# Patient Record
Sex: MALE | Race: WHITE
[De-identification: names, ages, dates, MRNs, and addresses within clinical notes are randomized per-mention and may not be internally consistent; named-entity substitution may affect disease eponyms.]

---
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
## 2022-07-09 ENCOUNTER — HOSPITAL ENCOUNTER (EMERGENCY)
Age: 22
Discharge: HOME | End: 2022-07-09
Payer: COMMERCIAL

## 2022-07-09 VITALS
OXYGEN SATURATION: 98 % | DIASTOLIC BLOOD PRESSURE: 78 MMHG | SYSTOLIC BLOOD PRESSURE: 122 MMHG | RESPIRATION RATE: 16 BRPM | HEART RATE: 78 BPM

## 2022-07-09 VITALS
OXYGEN SATURATION: 98 % | BODY MASS INDEX: 23 KG/M2 | TEMPERATURE: 97.34 F | RESPIRATION RATE: 15 BRPM | SYSTOLIC BLOOD PRESSURE: 124 MMHG | HEART RATE: 74 BPM | DIASTOLIC BLOOD PRESSURE: 77 MMHG

## 2022-07-09 DIAGNOSIS — Z72.0: ICD-10-CM
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
## 2022-07-09 DIAGNOSIS — S60.221A: Primary | ICD-10-CM

## 2022-07-09 PROCEDURE — 73130 X-RAY EXAM OF HAND: CPT

## 2022-07-09 PROCEDURE — 99282 EMERGENCY DEPT VISIT SF MDM: CPT

## 2022-10-28 ENCOUNTER — HOSPITAL ENCOUNTER (EMERGENCY)
Dept: HOSPITAL 100 - ED | Age: 22
Discharge: HOME | End: 2022-10-28
Payer: COMMERCIAL

## 2022-10-28 VITALS
OXYGEN SATURATION: 100 % | SYSTOLIC BLOOD PRESSURE: 142 MMHG | RESPIRATION RATE: 16 BRPM | TEMPERATURE: 98.24 F | HEART RATE: 85 BPM | DIASTOLIC BLOOD PRESSURE: 80 MMHG | BODY MASS INDEX: 24.3 KG/M2

## 2022-10-28 VITALS — RESPIRATION RATE: 16 BRPM

## 2022-10-28 VITALS
OXYGEN SATURATION: 100 % | RESPIRATION RATE: 16 BRPM | TEMPERATURE: 98.3 F | SYSTOLIC BLOOD PRESSURE: 142 MMHG | DIASTOLIC BLOOD PRESSURE: 80 MMHG | HEART RATE: 85 BPM

## 2022-10-28 DIAGNOSIS — L02.91: Primary | ICD-10-CM

## 2022-10-28 PROCEDURE — 99283 EMERGENCY DEPT VISIT LOW MDM: CPT

## 2023-05-24 ENCOUNTER — HOSPITAL ENCOUNTER (OUTPATIENT)
Dept: HOSPITAL 100 - PT | Age: 23
Discharge: HOME | End: 2023-05-24
Payer: COMMERCIAL

## 2023-05-24 DIAGNOSIS — M25.312: Primary | ICD-10-CM

## 2023-05-24 PROCEDURE — 97161 PT EVAL LOW COMPLEX 20 MIN: CPT

## 2023-05-24 PROCEDURE — G0283 ELEC STIM OTHER THAN WOUND: HCPCS

## 2023-05-24 PROCEDURE — 97014 ELECTRIC STIMULATION THERAPY: CPT

## 2023-06-13 ENCOUNTER — HOSPITAL ENCOUNTER (OUTPATIENT)
Age: 23
Discharge: HOME | End: 2023-06-13
Payer: COMMERCIAL

## 2023-06-13 DIAGNOSIS — M25.312: Primary | ICD-10-CM

## 2023-06-13 PROCEDURE — 23350 INJECTION FOR SHOULDER X-RAY: CPT

## 2023-06-13 PROCEDURE — 73222 MRI JOINT UPR EXTREM W/DYE: CPT

## 2023-06-13 PROCEDURE — 77002 NEEDLE LOCALIZATION BY XRAY: CPT

## 2024-02-20 NOTE — PROGRESS NOTES
Subjective      HPI:          Grzegorz Solano is a 23 y.o. male 23 y.o. is here today for PE/health maintenance      No chief complaint on file.      Pt was last seen in the office 2019       When was last Tdap?  2013 in our records    Has pt had more than one Gardisil ?          Pt also due for f/up chronic medical problems-remote history ADD- on adderall- 2017        [place] recommend  laboratory screening for Hepatitis C for all adults ages 18- 79 years.      [place] recommend  laboratory  screening for HIV in patients ages 15-65      Health Maintenance Topics        Topic Date     Yearly Adult Physical today    Lipid Panel Never done    HIV Screening Never done    HPV Vaccines Never done    Hepatitis C Screening Never done     Health Maintenance Topics with due status: Not Due       Topic Last Completion Date    Zoster Vaccines 04/03/2012     Health Maintenance Topics with due status: Completed       Topic Last Completion Date    HIB Vaccines 06/05/2002    Hepatitis B Vaccines 06/05/2002    IPV Vaccines 08/29/2006    MMR Vaccines 08/29/2006    Varicella Vaccines 04/03/2012    Hepatitis A Vaccines 10/08/2012     Health Maintenance Topics with due status: Aged Out       Topic Date Due    Meningococcal Vaccine Aged Out    Rotavirus Vaccines Aged Out    Pneumococcal Vaccine: Pediatrics (0 to 5 Years) and At-Risk Patients (6 to 64 Years) Aged Out                   There is no immunization history on file for this patient.      Social History     Tobacco Use   Smoking Status Not on file   Smokeless Tobacco Not on file                has no history on file for alcohol use.       No visits with results within 12 Month(s) from this visit.   Latest known visit with results is:   No results found for any previous visit.           No current outpatient medications on file.      ROS:            Objective        PE:    There were no vitals filed for this visit.            Pt is A and O x3, NAD  Head- normocephalic and  atraumatic,   EYES- conjunctiva- normal   lids- normal  EARS/NOSE- TM's normal, nasopharynx- normal and atraumatic  OROPHARYNX- normal  NECK- supple, FROM  THYROID- NT, normal size, no nodule noted  LYMPH- no cervical lymph nodes palpated   CV- RRR without murmur  PULM- CTA bilaterally, normal respiratory effort  RESPIRATORY EFFORT- normal , no retractions or nasal flaring   ABD- normoactive BS's , soft , NT, no hepatosplenomegaly palpated  EXT- no edema,NT  SKIN- no abnormal skin lesions noted  NEURO- no focal deficits  PSYCH- pleasant, normal judgement and insight    BP Readings from Last 3 Encounters:   No data found for BP         Wt Readings from Last 3 Encounters:   No data found for Wt         BMI Readings from Last 3 Encounters:   No data found for BMI       The number and complexity of problems addressed is considered moderate.  The amount and/or complexity of data reviewed and analyzed is considered moderate. The risk of complications and/or morbidity/mortality of patient is considered moderate. Overall, this patient encounter is considered a moderate risk visit.        Assessment/Plan      Problem List Items Addressed This Visit    None  Visit Diagnoses       Well adult exam    -  Primary            No orders of the defined types were placed in this encounter.            Follow up 12 months    Recommendations for men annual wellness exam:   Screening for Prostate Cancer- Men aged 55-69 years   colonoscopy at age 45, earlier if positive family history for breast or colon cancer   Screening for lung cancer with low-dose CT in all adults age 50-80 who have a 20 pack-year smoking history and currently smoke or have quit within the past 15 years  Screen AAA- Ultrasound one time only- if history smoking 100 cigarettes or more - men ages 65-75 years   Follow a healthy diet (Examples, Dash diet, Mediterranean diet)  Exercise 150 minutes per week   Maintain healthy weight (BMI < 25)  Do not smoke   Alcohol in  moderation (up to 1 drink/day)  Get enough sleep (7-8 hours/night)  Make sure immunizations are up to date   Visit dentist twice yearly    If you are less than 60 years old, have diabetes mellitus, or chronic kidney disease, your goal blood pressure is < 140/90.  If you are older than 60 years old and do not have diabetes or kidney disease, your goal blood pressure is < 150/90.

## 2024-02-23 ENCOUNTER — APPOINTMENT (OUTPATIENT)
Dept: PRIMARY CARE | Facility: CLINIC | Age: 24
End: 2024-02-23
Payer: COMMERCIAL

## 2024-02-27 ENCOUNTER — HOSPITAL ENCOUNTER (EMERGENCY)
Age: 24
Discharge: HOME | End: 2024-02-27
Payer: COMMERCIAL

## 2024-02-27 VITALS
RESPIRATION RATE: 19 BRPM | HEART RATE: 57 BPM | SYSTOLIC BLOOD PRESSURE: 129 MMHG | OXYGEN SATURATION: 100 % | DIASTOLIC BLOOD PRESSURE: 72 MMHG | TEMPERATURE: 98 F

## 2024-02-27 VITALS
DIASTOLIC BLOOD PRESSURE: 88 MMHG | OXYGEN SATURATION: 100 % | SYSTOLIC BLOOD PRESSURE: 140 MMHG | HEART RATE: 69 BPM | RESPIRATION RATE: 14 BRPM

## 2024-02-27 VITALS
OXYGEN SATURATION: 98 % | HEART RATE: 73 BPM | BODY MASS INDEX: 23.3 KG/M2 | DIASTOLIC BLOOD PRESSURE: 85 MMHG | SYSTOLIC BLOOD PRESSURE: 134 MMHG | RESPIRATION RATE: 16 BRPM | TEMPERATURE: 98.2 F

## 2024-02-27 VITALS
RESPIRATION RATE: 15 BRPM | TEMPERATURE: 97.6 F | SYSTOLIC BLOOD PRESSURE: 113 MMHG | HEART RATE: 53 BPM | OXYGEN SATURATION: 99 % | DIASTOLIC BLOOD PRESSURE: 88 MMHG

## 2024-02-27 VITALS
HEART RATE: 47 BPM | RESPIRATION RATE: 14 BRPM | DIASTOLIC BLOOD PRESSURE: 63 MMHG | OXYGEN SATURATION: 95 % | SYSTOLIC BLOOD PRESSURE: 104 MMHG

## 2024-02-27 DIAGNOSIS — M54.10: Primary | ICD-10-CM

## 2024-02-27 DIAGNOSIS — F17.220: ICD-10-CM

## 2024-02-27 DIAGNOSIS — R06.02: ICD-10-CM

## 2024-02-27 DIAGNOSIS — R11.0: ICD-10-CM

## 2024-02-27 LAB
ANION GAP: 3 (ref 5–15)
BUN SERPL-MCNC: 15 MG/DL (ref 7–18)
BUN/CREAT RATIO: 13.3 RATIO (ref 10–20)
CALCIUM SERPL-MCNC: 9.7 MG/DL (ref 8.5–10.1)
CARBON DIOXIDE: 29 MMOL/L (ref 21–32)
CHLORIDE: 110 MMOL/L (ref 98–107)
DEPRECATED RDW RBC: 36.1 FL (ref 35.1–43.9)
ERYTHROCYTE [DISTWIDTH] IN BLOOD: 11.8 % (ref 11.6–14.6)
EST GLOM FILT RATE - AFR AMER: 104 ML/MIN (ref 60–?)
ESTIMATED CREATININE CLEARANCE: 105.88 ML/MIN
GLUCOSE: 96 MG/DL (ref 74–106)
HCT VFR BLD AUTO: 44.7 % (ref 40–54)
HGB BLD-MCNC: 15.7 G/DL (ref 13–16.5)
IMMATURE GRANULOCYTES COUNT: 0.01 X10^3/UL (ref 0–0)
MCV RBC: 85.6 FL (ref 80–94)
MEAN CORP HGB CONC: 35.1 G/DL (ref 32–36)
MEAN PLATELET VOL.: 9.5 FL (ref 6.2–12)
NRBC FLAGGED BY ANALYZER: 0 % (ref 0–5)
PLATELET # BLD: 227 K/MM3 (ref 150–450)
POTASSIUM: 3.6 MMOL/L (ref 3.5–5.1)
RBC # BLD AUTO: 5.22 M/MM3 (ref 4.6–6.2)
TROPONIN-I HS: 6 PG/ML (ref 3–78)
WBC # BLD AUTO: 5.9 K/MM3 (ref 4.4–11)

## 2024-02-27 PROCEDURE — 93005 ELECTROCARDIOGRAM TRACING: CPT

## 2024-02-27 PROCEDURE — 74174 CTA ABD&PLVS W/CONTRAST: CPT

## 2024-02-27 PROCEDURE — 99285 EMERGENCY DEPT VISIT HI MDM: CPT

## 2024-02-27 PROCEDURE — A4216 STERILE WATER/SALINE, 10 ML: HCPCS

## 2024-02-27 PROCEDURE — 71275 CT ANGIOGRAPHY CHEST: CPT

## 2024-02-27 PROCEDURE — 82962 GLUCOSE BLOOD TEST: CPT

## 2024-02-27 PROCEDURE — 85025 COMPLETE CBC W/AUTO DIFF WBC: CPT

## 2024-02-27 PROCEDURE — 80048 BASIC METABOLIC PNL TOTAL CA: CPT

## 2024-02-27 PROCEDURE — 84484 ASSAY OF TROPONIN QUANT: CPT

## 2024-02-27 PROCEDURE — 87631 RESP VIRUS 3-5 TARGETS: CPT

## 2024-04-24 ENCOUNTER — HOSPITAL ENCOUNTER (OUTPATIENT)
Age: 24
Discharge: HOME | End: 2024-04-24
Payer: COMMERCIAL

## 2024-04-24 DIAGNOSIS — M54.14: Primary | ICD-10-CM

## 2024-04-24 DIAGNOSIS — M51.26: ICD-10-CM

## 2024-04-24 PROCEDURE — 72146 MRI CHEST SPINE W/O DYE: CPT

## 2024-04-24 PROCEDURE — 72148 MRI LUMBAR SPINE W/O DYE: CPT

## 2025-05-16 ENCOUNTER — HOSPITAL ENCOUNTER (OUTPATIENT)
Age: 25
Discharge: HOME | End: 2025-05-16
Payer: SELF-PAY

## 2025-05-16 DIAGNOSIS — M54.12: Primary | ICD-10-CM

## 2025-05-16 PROCEDURE — 72148 MRI LUMBAR SPINE W/O DYE: CPT
